# Patient Record
Sex: MALE | Race: OTHER | Employment: FULL TIME | URBAN - METROPOLITAN AREA
[De-identification: names, ages, dates, MRNs, and addresses within clinical notes are randomized per-mention and may not be internally consistent; named-entity substitution may affect disease eponyms.]

---

## 2024-10-11 ENCOUNTER — APPOINTMENT (OUTPATIENT)
Dept: CT IMAGING | Age: 39
DRG: 379 | End: 2024-10-11
Payer: COMMERCIAL

## 2024-10-11 ENCOUNTER — HOSPITAL ENCOUNTER (INPATIENT)
Age: 39
LOS: 2 days | Discharge: HOME | DRG: 379 | End: 2024-10-13
Attending: EMERGENCY MEDICINE | Admitting: INTERNAL MEDICINE
Payer: COMMERCIAL

## 2024-10-11 DIAGNOSIS — K92.1 MELENA: ICD-10-CM

## 2024-10-11 DIAGNOSIS — R10.13 EPIGASTRIC PAIN: Primary | ICD-10-CM

## 2024-10-11 DIAGNOSIS — D62 ACUTE BLOOD LOSS ANEMIA: ICD-10-CM

## 2024-10-11 PROBLEM — K92.2 CHRONIC GI BLEEDING: Status: ACTIVE | Noted: 2024-10-11

## 2024-10-11 LAB
ALBUMIN SERPL-MCNC: 3.5 G/DL (ref 3.5–5)
ALBUMIN/GLOB SERPL: 1.6 (ref 1–1.9)
ALP SERPL-CCNC: 59 U/L (ref 40–129)
ALT SERPL-CCNC: 17 U/L (ref 8–55)
ANION GAP SERPL CALC-SCNC: 9 MMOL/L (ref 9–18)
APPEARANCE UR: CLEAR
AST SERPL-CCNC: 33 U/L (ref 15–37)
BASOPHILS # BLD: 0 K/UL (ref 0–0.2)
BASOPHILS NFR BLD: 0 % (ref 0–2)
BILIRUB SERPL-MCNC: <0.2 MG/DL (ref 0–1.2)
BILIRUB UR QL: NEGATIVE
BUN SERPL-MCNC: 14 MG/DL (ref 6–23)
CALCIUM SERPL-MCNC: 8.5 MG/DL (ref 8.8–10.2)
CHLORIDE SERPL-SCNC: 107 MMOL/L (ref 98–107)
CO2 SERPL-SCNC: 25 MMOL/L (ref 20–28)
COLOR UR: NORMAL
CREAT SERPL-MCNC: 0.89 MG/DL (ref 0.8–1.3)
DIFFERENTIAL METHOD BLD: ABNORMAL
EOSINOPHIL # BLD: 0.1 K/UL (ref 0–0.8)
EOSINOPHIL NFR BLD: 1 % (ref 0.5–7.8)
ERYTHROCYTE [DISTWIDTH] IN BLOOD BY AUTOMATED COUNT: 14.6 % (ref 11.9–14.6)
GLOBULIN SER CALC-MCNC: 2.2 G/DL (ref 2.3–3.5)
GLUCOSE SERPL-MCNC: 98 MG/DL (ref 70–99)
GLUCOSE UR STRIP.AUTO-MCNC: NEGATIVE MG/DL
HCT VFR BLD AUTO: 21.7 % (ref 41.1–50.3)
HGB BLD-MCNC: 7.1 G/DL (ref 13.6–17.2)
HGB UR QL STRIP: NEGATIVE
HISTORY CHECK: NORMAL
IMM GRANULOCYTES # BLD AUTO: 0.1 K/UL (ref 0–0.5)
IMM GRANULOCYTES NFR BLD AUTO: 1 % (ref 0–5)
KETONES UR QL STRIP.AUTO: NEGATIVE MG/DL
LEUKOCYTE ESTERASE UR QL STRIP.AUTO: NEGATIVE
LIPASE SERPL-CCNC: 20 U/L (ref 13–60)
LYMPHOCYTES # BLD: 2.1 K/UL (ref 0.5–4.6)
LYMPHOCYTES NFR BLD: 21 % (ref 13–44)
MCH RBC QN AUTO: 29.3 PG (ref 26.1–32.9)
MCHC RBC AUTO-ENTMCNC: 32.7 G/DL (ref 31.4–35)
MCV RBC AUTO: 89.7 FL (ref 82–102)
MONOCYTES # BLD: 0.8 K/UL (ref 0.1–1.3)
MONOCYTES NFR BLD: 8 % (ref 4–12)
NEUTS SEG # BLD: 6.6 K/UL (ref 1.7–8.2)
NEUTS SEG NFR BLD: 68 % (ref 43–78)
NITRITE UR QL STRIP.AUTO: NEGATIVE
NRBC # BLD: 0 K/UL (ref 0–0.2)
PH UR STRIP: 5.5 (ref 5–9)
PLATELET # BLD AUTO: 226 K/UL (ref 150–450)
PMV BLD AUTO: 10 FL (ref 9.4–12.3)
POTASSIUM SERPL-SCNC: 3.8 MMOL/L (ref 3.5–5.1)
PROT SERPL-MCNC: 5.7 G/DL (ref 6.3–8.2)
PROT UR STRIP-MCNC: NEGATIVE MG/DL
RBC # BLD AUTO: 2.42 M/UL (ref 4.23–5.6)
SODIUM SERPL-SCNC: 141 MMOL/L (ref 136–145)
SP GR UR REFRACTOMETRY: 1.01 (ref 1–1.02)
UROBILINOGEN UR QL STRIP.AUTO: 0.2 EU/DL (ref 0.2–1)
WBC # BLD AUTO: 9.7 K/UL (ref 4.3–11.1)

## 2024-10-11 PROCEDURE — 2580000003 HC RX 258: Performed by: EMERGENCY MEDICINE

## 2024-10-11 PROCEDURE — 85025 COMPLETE CBC W/AUTO DIFF WBC: CPT

## 2024-10-11 PROCEDURE — 1100000000 HC RM PRIVATE

## 2024-10-11 PROCEDURE — 6360000004 HC RX CONTRAST MEDICATION: Performed by: INTERNAL MEDICINE

## 2024-10-11 PROCEDURE — 30233N1 TRANSFUSION OF NONAUTOLOGOUS RED BLOOD CELLS INTO PERIPHERAL VEIN, PERCUTANEOUS APPROACH: ICD-10-PCS | Performed by: INTERNAL MEDICINE

## 2024-10-11 PROCEDURE — 83690 ASSAY OF LIPASE: CPT

## 2024-10-11 PROCEDURE — 86901 BLOOD TYPING SEROLOGIC RH(D): CPT

## 2024-10-11 PROCEDURE — P9016 RBC LEUKOCYTES REDUCED: HCPCS

## 2024-10-11 PROCEDURE — 80053 COMPREHEN METABOLIC PANEL: CPT

## 2024-10-11 PROCEDURE — 74177 CT ABD & PELVIS W/CONTRAST: CPT

## 2024-10-11 PROCEDURE — 81003 URINALYSIS AUTO W/O SCOPE: CPT

## 2024-10-11 PROCEDURE — 84466 ASSAY OF TRANSFERRIN: CPT

## 2024-10-11 PROCEDURE — 86900 BLOOD TYPING SEROLOGIC ABO: CPT

## 2024-10-11 PROCEDURE — 86850 RBC ANTIBODY SCREEN: CPT

## 2024-10-11 PROCEDURE — 86923 COMPATIBILITY TEST ELECTRIC: CPT

## 2024-10-11 PROCEDURE — 83540 ASSAY OF IRON: CPT

## 2024-10-11 PROCEDURE — 36430 TRANSFUSION BLD/BLD COMPNT: CPT

## 2024-10-11 PROCEDURE — 99285 EMERGENCY DEPT VISIT HI MDM: CPT

## 2024-10-11 PROCEDURE — 6360000002 HC RX W HCPCS: Performed by: EMERGENCY MEDICINE

## 2024-10-11 RX ORDER — IOPAMIDOL 755 MG/ML
100 INJECTION, SOLUTION INTRAVASCULAR
Status: COMPLETED | OUTPATIENT
Start: 2024-10-11 | End: 2024-10-11

## 2024-10-11 RX ADMIN — IOPAMIDOL 100 ML: 755 INJECTION, SOLUTION INTRAVENOUS at 23:45

## 2024-10-11 RX ADMIN — PANTOPRAZOLE SODIUM 80 MG: 40 INJECTION, POWDER, FOR SOLUTION INTRAVENOUS at 23:17

## 2024-10-11 ASSESSMENT — LIFESTYLE VARIABLES
HOW MANY STANDARD DRINKS CONTAINING ALCOHOL DO YOU HAVE ON A TYPICAL DAY: 1 OR 2
HOW OFTEN DO YOU HAVE A DRINK CONTAINING ALCOHOL: MONTHLY OR LESS

## 2024-10-12 PROBLEM — R10.13 EPIGASTRIC PAIN: Status: ACTIVE | Noted: 2024-10-12

## 2024-10-12 LAB
ABO + RH BLD: NORMAL
ANION GAP SERPL CALC-SCNC: 9 MMOL/L (ref 9–18)
BLD PROD TYP BPU: NORMAL
BLOOD BANK BLOOD PRODUCT EXPIRATION DATE: NORMAL
BLOOD BANK CMNT PATIENT-IMP: NORMAL
BLOOD BANK DISPENSE STATUS: NORMAL
BLOOD BANK ISBT PRODUCT BLOOD TYPE: 9500
BLOOD BANK UNIT TYPE AND RH: NORMAL
BLOOD GROUP ANTIBODIES SERPL: NORMAL
BPU ID: NORMAL
BUN SERPL-MCNC: 9 MG/DL (ref 6–23)
CALCIUM SERPL-MCNC: 8.3 MG/DL (ref 8.8–10.2)
CHLORIDE SERPL-SCNC: 107 MMOL/L (ref 98–107)
CO2 SERPL-SCNC: 25 MMOL/L (ref 20–28)
CREAT SERPL-MCNC: 0.76 MG/DL (ref 0.8–1.3)
CROSSMATCH RESULT: NORMAL
ERYTHROCYTE [DISTWIDTH] IN BLOOD BY AUTOMATED COUNT: 15 % (ref 11.9–14.6)
FERRITIN SERPL-MCNC: 59 NG/ML (ref 8–388)
GLUCOSE SERPL-MCNC: 98 MG/DL (ref 70–99)
HCT VFR BLD AUTO: 22.3 % (ref 41.1–50.3)
HCT VFR BLD AUTO: 24.5 % (ref 41.1–50.3)
HCT VFR BLD AUTO: 25.4 % (ref 41.1–50.3)
HCT VFR BLD AUTO: 25.5 % (ref 41.1–50.3)
HGB BLD-MCNC: 7.2 G/DL (ref 13.6–17.2)
HGB BLD-MCNC: 8.1 G/DL (ref 13.6–17.2)
HGB BLD-MCNC: 8.2 G/DL (ref 13.6–17.2)
HGB BLD-MCNC: 8.3 G/DL (ref 13.6–17.2)
IRON SATN MFR SERPL: 20 % (ref 20–50)
IRON SERPL-MCNC: 45 UG/DL (ref 35–100)
MCH RBC QN AUTO: 29.3 PG (ref 26.1–32.9)
MCHC RBC AUTO-ENTMCNC: 33.1 G/DL (ref 31.4–35)
MCV RBC AUTO: 88.8 FL (ref 82–102)
NRBC # BLD: 0 K/UL (ref 0–0.2)
PLATELET # BLD AUTO: 205 K/UL (ref 150–450)
PMV BLD AUTO: 10.1 FL (ref 9.4–12.3)
POTASSIUM SERPL-SCNC: 3.8 MMOL/L (ref 3.5–5.1)
RBC # BLD AUTO: 2.76 M/UL (ref 4.23–5.6)
SODIUM SERPL-SCNC: 141 MMOL/L (ref 136–145)
SPECIMEN EXP DATE BLD: NORMAL
TIBC SERPL-MCNC: 224 UG/DL (ref 240–450)
TRANSFERRIN SERPL-MCNC: 187 MG/DL (ref 200–360)
UIBC SERPL-MCNC: 179 UG/DL (ref 112–347)
UNIT DIVISION: 0
UNIT ISSUE DATE/TIME: NORMAL
WBC # BLD AUTO: 6.1 K/UL (ref 4.3–11.1)

## 2024-10-12 PROCEDURE — 82728 ASSAY OF FERRITIN: CPT

## 2024-10-12 PROCEDURE — 2580000003 HC RX 258: Performed by: INTERNAL MEDICINE

## 2024-10-12 PROCEDURE — 80048 BASIC METABOLIC PNL TOTAL CA: CPT

## 2024-10-12 PROCEDURE — 43235 EGD DIAGNOSTIC BRUSH WASH: CPT | Performed by: INTERNAL MEDICINE

## 2024-10-12 PROCEDURE — 85027 COMPLETE CBC AUTOMATED: CPT

## 2024-10-12 PROCEDURE — 85018 HEMOGLOBIN: CPT

## 2024-10-12 PROCEDURE — 85014 HEMATOCRIT: CPT

## 2024-10-12 PROCEDURE — 6360000002 HC RX W HCPCS: Performed by: INTERNAL MEDICINE

## 2024-10-12 PROCEDURE — 1100000003 HC PRIVATE W/ TELEMETRY

## 2024-10-12 RX ORDER — SODIUM CHLORIDE 0.9 % (FLUSH) 0.9 %
5-40 SYRINGE (ML) INJECTION EVERY 12 HOURS SCHEDULED
Status: DISCONTINUED | OUTPATIENT
Start: 2024-10-12 | End: 2024-10-13 | Stop reason: HOSPADM

## 2024-10-12 RX ORDER — SODIUM CHLORIDE 9 MG/ML
INJECTION, SOLUTION INTRAVENOUS PRN
Status: DISCONTINUED | OUTPATIENT
Start: 2024-10-12 | End: 2024-10-13 | Stop reason: HOSPADM

## 2024-10-12 RX ORDER — ACETAMINOPHEN 325 MG/1
650 TABLET ORAL EVERY 6 HOURS PRN
Status: DISCONTINUED | OUTPATIENT
Start: 2024-10-12 | End: 2024-10-13 | Stop reason: HOSPADM

## 2024-10-12 RX ORDER — ONDANSETRON 4 MG/1
4 TABLET, ORALLY DISINTEGRATING ORAL EVERY 8 HOURS PRN
Status: DISCONTINUED | OUTPATIENT
Start: 2024-10-12 | End: 2024-10-13 | Stop reason: HOSPADM

## 2024-10-12 RX ORDER — ONDANSETRON 2 MG/ML
4 INJECTION INTRAMUSCULAR; INTRAVENOUS EVERY 6 HOURS PRN
Status: DISCONTINUED | OUTPATIENT
Start: 2024-10-12 | End: 2024-10-13 | Stop reason: HOSPADM

## 2024-10-12 RX ORDER — ACETAMINOPHEN 650 MG/1
650 SUPPOSITORY RECTAL EVERY 6 HOURS PRN
Status: DISCONTINUED | OUTPATIENT
Start: 2024-10-12 | End: 2024-10-13 | Stop reason: HOSPADM

## 2024-10-12 RX ORDER — SODIUM CHLORIDE, SODIUM LACTATE, POTASSIUM CHLORIDE, CALCIUM CHLORIDE 600; 310; 30; 20 MG/100ML; MG/100ML; MG/100ML; MG/100ML
INJECTION, SOLUTION INTRAVENOUS CONTINUOUS
Status: DISCONTINUED | OUTPATIENT
Start: 2024-10-12 | End: 2024-10-12

## 2024-10-12 RX ORDER — SODIUM CHLORIDE 0.9 % (FLUSH) 0.9 %
5-40 SYRINGE (ML) INJECTION PRN
Status: DISCONTINUED | OUTPATIENT
Start: 2024-10-12 | End: 2024-10-13 | Stop reason: HOSPADM

## 2024-10-12 RX ADMIN — PANTOPRAZOLE SODIUM 40 MG: 40 INJECTION, POWDER, FOR SOLUTION INTRAVENOUS at 19:29

## 2024-10-12 RX ADMIN — SODIUM CHLORIDE, POTASSIUM CHLORIDE, SODIUM LACTATE AND CALCIUM CHLORIDE: 600; 310; 30; 20 INJECTION, SOLUTION INTRAVENOUS at 00:25

## 2024-10-12 RX ADMIN — PANTOPRAZOLE SODIUM 40 MG: 40 INJECTION, POWDER, FOR SOLUTION INTRAVENOUS at 09:24

## 2024-10-12 RX ADMIN — SODIUM CHLORIDE, PRESERVATIVE FREE 10 ML: 5 INJECTION INTRAVENOUS at 19:29

## 2024-10-12 ASSESSMENT — PAIN SCALES - GENERAL
PAINLEVEL_OUTOF10: 0
PAINLEVEL_OUTOF10: 0

## 2024-10-12 NOTE — PROGRESS NOTES
4 Eyes Skin Assessment     NAME:  Edwin Reilly  YOB: 1985  MEDICAL RECORD NUMBER:  [id number]    The patient is being assessed for  Admission    I agree that at least one RN has performed a thorough Head to Toe Skin Assessment on the patient. ALL assessment sites listed below have been assessed.      Areas assessed by both nurses:    Head, Face, Ears, Shoulders, Back, Chest, Arms, Elbows, Hands, Sacrum. Buttock, Coccyx, Ischium, and Legs. Feet and Heels        Does the Patient have a Wound? No noted wound(s)       Inocencio Prevention initiated by RN: No  Wound Care Orders initiated by RN: No    Pressure Injury (Stage 3,4, Unstageable, DTI, NWPT, and Complex wounds) if present, place Wound referral order by RN under : No    New Ostomies, if present place, Ostomy referral order under : No     Nurse 1 eSignature: Electronically signed by LOUIE MILNER RN on 10/12/24 at 12:31 AM EDT    **SHARE this note so that the co-signing nurse can place an eSignature**    Nurse 2 eSignature: Electronically signed by Allie Shahid RN on 10/12/24 at 1:09 AM EDT    Libtayo Counseling- I discussed with the patient the risks of Libtayo including but not limited to nausea, vomiting, diarrhea, and bone or muscle pain.  The patient verbalized understanding of the proper use and possible adverse effects of Libtayo.  All of the patient's questions and concerns were addressed.

## 2024-10-12 NOTE — H&P
Hospitalist History and Physical   Admit Date:  10/11/2024  8:55 PM   Name:  Edwin Reilly   Age:  39 y.o.  Sex:  male  :  1985   MRN:  [id number]   Room:  [place] [address]    Presenting/Chief Complaint: Melena and Abdominal Pain     Reason(s) for Admission: Chronic GI bleeding [K92.2]     History of Present Illness:   Edwin Reilly is a 39 y.o. male with no signal past medical history presents to the emergency room complaining of abdominal pain mainly in the epigastric area associate with some dark stools over the last 3 days.  He states that he has been having 1 or 2 episodes per day denies ever having history of any rectal bleeding.  Denies any nausea vomiting or hematemesis he is not on any blood thinner does not take any medications he did take ibuprofen 400 mg daily for the last 3 days because of abdominal pain patient was evaluated with ED physician subsequently found to have hemoglobin of 7.1 Hemoccult was positive subsequently GI was called was called and they recommended admitting the patient here at [facility] [place] they will see the patient in the morning and hence the patient will be admitted for GI bleeding will start him on Protonix keep him n.p.o. start him on IV fluids hopefully GI will be consulted for the morning so that they can see him and probably scoped him find out if it is a peptic ulcer disease although he does not give any history of history of dyspepsia etc. however despite being Hemoccult positive hemoglobin being so low he does not have elevation of the BUN or creatinine suggestive of an acute bleed this could be chronic and hence will also check LDH and haptoglobin to make sure there is no hemolysis #1 the other thing is possibly CT of the abdomen pelvis to see for any retroperitoneal hematoma etc.      Assessment & Plan:     Principal Problem:  Chronic GI bleeding  On fluids  On IV Protonix twice daily  NPO  GI to see in a.m.  Check LDH and serum haptoglobin  CT

## 2024-10-12 NOTE — ED TRIAGE NOTES
Pt presents with complaint of abdominal and back pain.  Pt reports he has been having dark stools for three days.  He denies n/v/d. Denies thinners.     required.

## 2024-10-12 NOTE — CONSENT
Informed Consent for Blood Component Transfusion Note    I have discussed with the patient the rationale for blood component transfusion; its benefits in treating or preventing fatigue, organ damage, or death; and its risk which includes mild transfusion reactions, rare risk of blood borne infection, or more serious but rare reactions. I have discussed the alternatives to transfusion, including the risk and consequences of not receiving transfusion. The patient had an opportunity to ask questions and had agreed to proceed with transfusion of blood components.    Electronically signed by Arielle Bess MD on 10/11/24 at 10:13 PM EDT

## 2024-10-12 NOTE — PROGRESS NOTES
Creatinine 0.89 0.80 - 1.30 MG/DL    Est, Glom Filt Rate >90 >60 ml/min/1.73m2    Calcium 8.5 (L) 8.8 - 10.2 MG/DL    Total Bilirubin <0.2 0.0 - 1.2 MG/DL    ALT 17 8 - 55 U/L    AST 33 15 - 37 U/L    Alk Phosphatase 59 40 - 129 U/L    Total Protein 5.7 (L) 6.3 - 8.2 g/dL    Albumin 3.5 3.5 - 5.0 g/dL    Globulin 2.2 (L) 2.3 - 3.5 g/dL    Albumin/Globulin Ratio 1.6 1.0 - 1.9     Lipase    Collection Time: 10/11/24  9:04 PM   Result Value Ref Range    Lipase 20 13 - 60 U/L   Urinalysis “IF” dysuria, frequency, or urgency.    Collection Time: 10/11/24  9:04 PM   Result Value Ref Range    Color, UA YELLOW/STRAW      Appearance CLEAR      Specific Gravity, UA 1.014 1.001 - 1.023      pH, Urine 5.5 5.0 - 9.0      Protein, UA Negative NEG mg/dL    Glucose, Ur Negative NEG mg/dL    Ketones, Urine Negative NEG mg/dL    Bilirubin, Urine Negative NEG      Blood, Urine Negative NEG      Urobilinogen, Urine 0.2 0.2 - 1.0 EU/dL    Nitrite, Urine Negative NEG      Leukocyte Esterase, Urine Negative NEG     Iron and TIBC    Collection Time: 10/11/24  9:04 PM   Result Value Ref Range    Iron 45 35 - 100 ug/dL    TIBC 224 (L) 240 - 450 ug/dL    Iron % Saturation 20 20 - 50 %    UIBC 179.0 112.0 - 347.0 ug/dL   Transferrin    Collection Time: 10/11/24  9:04 PM   Result Value Ref Range    Transferrin 187 (L) 200 - 360 mg/dL   TYPE AND SCREEN    Collection Time: 10/11/24  9:41 PM   Result Value Ref Range    Crossmatch expiration date 10/14/2024,6209     ABO/Rh O NEGATIVE     Antibody Screen NEG     Blood Bank Comment CALLED ER AT 2236 THAT BLOOD IS READY. DUNN/6295     Unit Number [id number]     Product Code Blood Bank RC LR     Unit Divison 00     Dispense Status Blood Bank TRANSFUSED     Unit Issue Date/Time [id number]     Blood Bank Unit Type and Rh O NEG     Blood Bank ISBT Product Blood Type 9500     Blood Bank Blood Product Expiration Date [id number]     Crossmatch Result Compatible    PREPARE RBC (CROSSMATCH), 1 Units     dictation software.  The note has been proof read but may still contain some grammatical/other typographical errors.

## 2024-10-12 NOTE — CARE COORDINATION
CM note:    Chart reviewed for updates. Pt is Sami speaking. [employer] interpreters used.CM met with pt at bedside, introduced role, confirmed demographics and discussed d/c planning. Patient is a 39 y.o. year old male admitted for Stomach and back pain. Pt lives with spouse in an apartment. He is independent with ADL's at home and he does not use any DME at home. Pt is insured but he does not have a PCP. Referral sent to [facility] for a PCP as requested by the patient. Pt is an active  in the community. He denied history of IPR/SNF/HH. Pt's spouse and cousin are his supports. Plan is for pt to discharge home with family support pending medical clearance.      10/12/24 0095   Service Assessment   Patient Orientation Alert and Oriented   Cognition Alert   History Provided By Patient   Primary Caregiver Self   Accompanied By/Relationship N/A   Support Systems Spouse/Significant Other   Patient's Healthcare Decision Maker is: Legal Next of Kin  (Spouse)   Prior Functional Level Independent in ADLs/IADLs   Current Functional Level Independent in ADLs/IADLs   Can patient return to prior living arrangement Yes   Ability to make needs known: Good   Family able to assist with home care needs: Yes   Would you like for me to discuss the discharge plan with any other family members/significant others, and if so, who? No   Financial Resources Other (Comment)  (BCBS)   Social/Functional History   Lives With Spouse   Type of Home Apartment   Home Equipment None   ADL Assistance Independent   Ambulation Assistance Independent   Active  Yes   Discharge Planning   Type of Residence Apartment   Living Arrangements Spouse/Significant Other   Current Services Prior To Admission None   Potential Assistance Needed N/A   DME Ordered? No   Potential Assistance Purchasing Medications No   Type of Home Care Services None   Patient expects to be discharged to: [facility]

## 2024-10-12 NOTE — PROGRESS NOTES
Received pt from ED. Receiving 1UPRBC. A&Ox4 and ambulatory. Oriented to room and call bell. Family at bedside.

## 2024-10-12 NOTE — ED PROVIDER NOTES
Emergency Department Provider Note       PCP: No, Pcp   Age: 39 y.o.   Sex: male     DISPOSITION Decision To Admit 10/11/2024 10:36:37 PM  Condition at Disposition: Data Unavailable       ICD-10-CM    1. Epigastric pain  R10.13       2. Melena  K92.1       3. Acute blood loss anemia  D62           Medical Decision Making     Patient has melena for the last 3 days he is a healthy 39-year-old. He has no prior CBCs but his HGB is 7.1. Hemoccult positive. I suspect he is likely had an ulcer as he has some epigastric discomfort and some reflux. he is Hemodynamically stable.  Given that he has active bleed and likely acute drop in blood over the last 3 days. I've ordered 1 unit of blood and 80 mg of protonix bolus. I spoke with GI and they said he can stay at [facility] and they will consult here.  I communicated with Hospitalist for admission.       1 or more acute illnesses that pose a threat to life or bodily function.   Drug therapy given requiring intensive monitoring for toxicity.  Discussion with external consultants.  Shared medical decision making was utilized in creating the patients health plan today.  I independently ordered and reviewed each unique test.               The patient was admitted and I have discussed patient management with the admitting provider.  The management of this patient was discussed with an external consultant.        Critical care procedure note : 35 minutes of critical care time was performed in the emergency department. This was separate from any other procedures listed during the patients emergency department course. The failure to initiate these interventions on an urgent basis would likely have resulted in sudden, clinically significant or life-threatening deterioration in the patients condition.     History     Patient has abdominal pain in epigastric region and some dark stools over the last 3 days.  Has been having a couple episodes per day.  He denies ever having a history

## 2024-10-12 NOTE — PROGRESS NOTES
Hospitalist Progress Note   Admit Date:  10/11/2024  8:55 PM   Name:  Edwin Reilly   Age:  39 y.o.  Sex:  male  :  1985   MRN:  [id number]   Room:  [facility]/    Presenting/Chief Complaint: Melena and Abdominal Pain     Reason(s) for Admission: Melena [K92.1]  Acute blood loss anemia [D62]  Epigastric pain [R10.13]  Chronic GI bleeding [K92.2]     Hospital Course:   Edwin Reilly is a 39 y.o. male with no signal past medical history presents to the emergency room complaining of abdominal pain mainly in the epigastric area associated with some dark stools over the last 3 days.  Patient reports that the stools black.  He denied any significant fevers, chills, chest pain, nausea, vomiting.  He currently does not take any blood thinners, antiplatelet agents.  He has been taking ibuprofen and daily for the last 3 days due to abdominal pain.    In the ER, patient was found to have hemoglobin of 7.1.  Hemoccult was also positive.  GI was called and patient was admitted to hospitalist service for further workup.  Patient was given 1 unit PRBCs with appropriate hemoglobin response.    Subjective & 24hr Events:   Patient was seen and evaluated at bedside this morning.  Reports having minimal abdominal discomfort today.  Did have 1 bowel movement overnight that was not dark.  Denies any fevers chills, shortness of breath, dizziness, nausea, vomiting.      Assessment & Plan:     Normocytic anemia secondary to suspected GI bleeding  Hemoglobin was 7.1 on admission, hemoglobin responded appropriately after 1 unit PRBCs given.  Suspect upper GI bleed.  Appears to be well compensated as patient is not tachycardic or hypotensive on admission with a hemoglobin of 7.1.  Iron studies not impressive for significant iron deficiency anemia.  CT abdomen pelvis showing no evidence of colorectal mass.    -Follow-up GI consult  -Clear liquid diet today  -Continue with Protonix  -Transfuse to maintain hemoglobin greater     CO2 25 20 - 28 mmol/L    Anion Gap 9 9 - 18 mmol/L    Glucose 98 70 - 99 mg/dL    BUN 9 6 - 23 MG/DL    Creatinine 0.76 (L) 0.80 - 1.30 MG/DL    Est, Glom Filt Rate >90 >60 ml/min/1.73m2    Calcium 8.3 (L) 8.8 - 10.2 MG/DL   CBC    Collection Time: 10/12/24  8:19 AM   Result Value Ref Range    WBC 6.1 4.3 - 11.1 K/uL    RBC 2.76 (L) 4.23 - 5.6 M/uL    Hemoglobin 8.1 (L) 13.6 - 17.2 g/dL    Hematocrit 24.5 (L) 41.1 - 50.3 %    MCV 88.8 82.0 - 102.0 FL    MCH 29.3 26.1 - 32.9 PG    MCHC 33.1 31.4 - 35.0 g/dL    RDW 15.0 (H) 11.9 - 14.6 %    Platelets 205 150 - 450 K/uL    MPV 10.1 9.4 - 12.3 FL    nRBC 0.00 0.0 - 0.2 K/uL   Ferritin    Collection Time: 10/12/24  8:19 AM   Result Value Ref Range    Ferritin 59 8 - 388 NG/ML       No results for input(s): \"COVID19\" in the last 72 hours.    Current Meds:  Current Facility-Administered Medications   Medication Dose Route Frequency    lactated ringers IV soln infusion   IntraVENous Continuous    sodium chloride flush 0.9 % injection 5-40 mL  5-40 mL IntraVENous 2 times per day    sodium chloride flush 0.9 % injection 5-40 mL  5-40 mL IntraVENous PRN    0.9 % sodium chloride infusion   IntraVENous PRN    ondansetron (ZOFRAN-ODT) disintegrating tablet 4 mg  4 mg Oral Q8H PRN    Or    ondansetron (ZOFRAN) injection 4 mg  4 mg IntraVENous Q6H PRN    acetaminophen (TYLENOL) tablet 650 mg  650 mg Oral Q6H PRN    Or    acetaminophen (TYLENOL) suppository 650 mg  650 mg Rectal Q6H PRN    pantoprazole (PROTONIX) 40 mg in sodium chloride (PF) 0.9 % 10 mL injection  40 mg IntraVENous Q12H       Signed:  Jarred Bruno MD    Part of this note may have been written by using a voice dictation software.  The note has been proof read but may still contain some grammatical/other typographical errors.

## 2024-10-12 NOTE — PROGRESS NOTES
TRANSFER - IN REPORT:    Verbal report received from HOLA Sher on Edwin Reilly  being received from ED for routine progression of patient care      Report consisted of patient's Situation, Background, Assessment and   Recommendations(SBAR).     Information from the following report(s) Nurse Handoff Report, Index, ED Encounter Summary, ED SBAR, Neuro Assessment, and Event Log was reviewed with the receiving nurse.    Opportunity for questions and clarification was provided.      Assessment completed upon patient's arrival to unit and care assumed.

## 2024-10-12 NOTE — CONSULTS
GASTROENTEROLOGY CONSULT NOTE     CC: Anemia     HPI:   Edwin Reilly is 39 y.o. y/o male presenting with acute anemias with possible melena. Per patient last week Wednesday he noticed black stools and then again yesterday. He endorses epigastric abdominal pain that radiates to his back. He takes Ibuprofen a few times week but started taking it more over the last few times because of abdominal pain. He was found to have hgb 7.1 in the ER. Last BM was overnight, which he states was normal.     PMH/PSH:   No significant PMH     FMH:   Denies FMH gastric or colorectal cancer   Denies FMH autoimmune disease     PE:   Vitals:    10/12/24 0800   BP: 134/77   Pulse: 70   Resp: 18   Temp: 97.8 °F (36.6 °C)   SpO2: 96%      General:  The patient appears well-nourished, and is in no acute distress.    Respiratory: Respiratory effort is normal. Expansion maintained bilaterally and symmetrically.    Cardiovascular:  Regular rate and rhythm.     Abdomen:  Soft, non tender to palpation. No distention.    Extremities: No edema bilaterally. No erythema  Neurologic:  Alert and oriented x3      Labs:  Lab Results   Component Value Date    HGB 8.1 (L) 10/12/2024    WBC 6.1 10/12/2024     10/12/2024    MCV 88.8 10/12/2024    IRON 45 10/11/2024    FERRITIN 59 10/12/2024    TIBC 224 (L) 10/11/2024    CREATININE 0.76 (L) 10/12/2024    ALT 17 10/11/2024    AST 33 10/11/2024         Imaging:   Ct abdomen pelvis 10/11/24  No acute findings in the abdomen or pelvis.     Endoscopy:   None       Assessment/Plan:   Anemia with possible melena. Plan for EGD tomorrow. NPO midnight. Ok for clear liquid diet today. IV PPI BID.     Please call GI team with any questions or concerns.       Enedina Kirby MD  [facility] Gastroenterology

## 2024-10-12 NOTE — ED NOTES
Report given to RN for 332/ pts VS updated and rate change completed/ pt to CT and transported upstairs by lorena SIMENTAL Lakhwinder

## 2024-10-13 ENCOUNTER — ANESTHESIA (OUTPATIENT)
Dept: ENDOSCOPY | Age: 39
End: 2024-10-13
Payer: COMMERCIAL

## 2024-10-13 ENCOUNTER — ANESTHESIA EVENT (OUTPATIENT)
Dept: ENDOSCOPY | Age: 39
End: 2024-10-13
Payer: COMMERCIAL

## 2024-10-13 VITALS
HEIGHT: 68 IN | DIASTOLIC BLOOD PRESSURE: 52 MMHG | SYSTOLIC BLOOD PRESSURE: 105 MMHG | RESPIRATION RATE: 15 BRPM | TEMPERATURE: 97.5 F | OXYGEN SATURATION: 100 % | WEIGHT: 185 LBS | BODY MASS INDEX: 28.04 KG/M2 | HEART RATE: 79 BPM

## 2024-10-13 LAB
ANION GAP SERPL CALC-SCNC: 10 MMOL/L (ref 9–18)
BUN SERPL-MCNC: 8 MG/DL (ref 6–23)
CALCIUM SERPL-MCNC: 8.2 MG/DL (ref 8.8–10.2)
CHLORIDE SERPL-SCNC: 106 MMOL/L (ref 98–107)
CO2 SERPL-SCNC: 25 MMOL/L (ref 20–28)
CREAT SERPL-MCNC: 0.8 MG/DL (ref 0.8–1.3)
ERYTHROCYTE [DISTWIDTH] IN BLOOD BY AUTOMATED COUNT: 15 % (ref 11.9–14.6)
GLUCOSE SERPL-MCNC: 90 MG/DL (ref 70–99)
HCT VFR BLD AUTO: 25.6 % (ref 41.1–50.3)
HGB BLD-MCNC: 8.4 G/DL (ref 13.6–17.2)
MCH RBC QN AUTO: 29.2 PG (ref 26.1–32.9)
MCHC RBC AUTO-ENTMCNC: 32.8 G/DL (ref 31.4–35)
MCV RBC AUTO: 88.9 FL (ref 82–102)
NRBC # BLD: 0 K/UL (ref 0–0.2)
PLATELET # BLD AUTO: 236 K/UL (ref 150–450)
PMV BLD AUTO: 9.7 FL (ref 9.4–12.3)
POTASSIUM SERPL-SCNC: 3.9 MMOL/L (ref 3.5–5.1)
RBC # BLD AUTO: 2.88 M/UL (ref 4.23–5.6)
SODIUM SERPL-SCNC: 141 MMOL/L (ref 136–145)
WBC # BLD AUTO: 6.4 K/UL (ref 4.3–11.1)

## 2024-10-13 PROCEDURE — 3700000000 HC ANESTHESIA ATTENDED CARE: Performed by: INTERNAL MEDICINE

## 2024-10-13 PROCEDURE — 88312 SPECIAL STAINS GROUP 1: CPT

## 2024-10-13 PROCEDURE — 88305 TISSUE EXAM BY PATHOLOGIST: CPT

## 2024-10-13 PROCEDURE — 36415 COLL VENOUS BLD VENIPUNCTURE: CPT

## 2024-10-13 PROCEDURE — 2580000003 HC RX 258: Performed by: INTERNAL MEDICINE

## 2024-10-13 PROCEDURE — 85027 COMPLETE CBC AUTOMATED: CPT

## 2024-10-13 PROCEDURE — 3700000001 HC ADD 15 MINUTES (ANESTHESIA): Performed by: INTERNAL MEDICINE

## 2024-10-13 PROCEDURE — 6360000002 HC RX W HCPCS: Performed by: INTERNAL MEDICINE

## 2024-10-13 PROCEDURE — 0DB68ZX EXCISION OF STOMACH, VIA NATURAL OR ARTIFICIAL OPENING ENDOSCOPIC, DIAGNOSTIC: ICD-10-PCS | Performed by: INTERNAL MEDICINE

## 2024-10-13 PROCEDURE — 6360000002 HC RX W HCPCS: Performed by: ANESTHESIOLOGY

## 2024-10-13 PROCEDURE — 7100000010 HC PHASE II RECOVERY - FIRST 15 MIN: Performed by: INTERNAL MEDICINE

## 2024-10-13 PROCEDURE — 80048 BASIC METABOLIC PNL TOTAL CA: CPT

## 2024-10-13 PROCEDURE — 2500000003 HC RX 250 WO HCPCS: Performed by: ANESTHESIOLOGY

## 2024-10-13 PROCEDURE — 3609012400 HC EGD TRANSORAL BIOPSY SINGLE/MULTIPLE: Performed by: INTERNAL MEDICINE

## 2024-10-13 PROCEDURE — 2709999900 HC NON-CHARGEABLE SUPPLY: Performed by: INTERNAL MEDICINE

## 2024-10-13 PROCEDURE — 7100000011 HC PHASE II RECOVERY - ADDTL 15 MIN: Performed by: INTERNAL MEDICINE

## 2024-10-13 RX ORDER — SODIUM CHLORIDE 9 MG/ML
INJECTION, SOLUTION INTRAVENOUS CONTINUOUS
Status: DISCONTINUED | OUTPATIENT
Start: 2024-10-13 | End: 2024-10-13 | Stop reason: HOSPADM

## 2024-10-13 RX ORDER — EPHEDRINE SULFATE/0.9% NACL/PF 50 MG/5 ML
SYRINGE (ML) INTRAVENOUS
Status: DISCONTINUED | OUTPATIENT
Start: 2024-10-13 | End: 2024-10-13 | Stop reason: SDUPTHER

## 2024-10-13 RX ORDER — PROPOFOL 10 MG/ML
INJECTION, EMULSION INTRAVENOUS
Status: DISCONTINUED | OUTPATIENT
Start: 2024-10-13 | End: 2024-10-13 | Stop reason: SDUPTHER

## 2024-10-13 RX ORDER — PANTOPRAZOLE SODIUM 40 MG/1
40 TABLET, DELAYED RELEASE ORAL
Qty: 112 TABLET | Refills: 0 | Status: SHIPPED | OUTPATIENT
Start: 2024-10-13 | End: 2024-12-08

## 2024-10-13 RX ADMIN — PHENYLEPHRINE HYDROCHLORIDE 150 MCG: 0.1 INJECTION, SOLUTION INTRAVENOUS at 08:20

## 2024-10-13 RX ADMIN — Medication 10 MG: at 08:20

## 2024-10-13 RX ADMIN — Medication 10 MG: at 08:14

## 2024-10-13 RX ADMIN — PROPOFOL 100 MG: 10 INJECTION, EMULSION INTRAVENOUS at 08:10

## 2024-10-13 RX ADMIN — PROPOFOL 50 MG: 10 INJECTION, EMULSION INTRAVENOUS at 08:14

## 2024-10-13 RX ADMIN — PROPOFOL 25 MG: 10 INJECTION, EMULSION INTRAVENOUS at 08:18

## 2024-10-13 RX ADMIN — PANTOPRAZOLE SODIUM 40 MG: 40 INJECTION, POWDER, FOR SOLUTION INTRAVENOUS at 09:23

## 2024-10-13 NOTE — CARE COORDINATION
CM note:    Chart reviewed for updates. Pt has a discharge order. CM notified that pt was requesting to speak with CM regarding financial assistance. Pt is Bengali speaking. CM used [employer] interpreters. Session code 62015. : Nat. CM introduced role and discussed financial concerns with pt. Pt notified that he has BCBS and it should be able to cover for his inpatient stay. Pt was given financial assistance form, [employer], [employer] and community resources. He was notified that he may or may not qualify for financial assistance since he does have BCBS insurance. No additional questions/concerns. Spouse at bedside will provide transport. No further CM needs identified. CM will remain accessible for consult incase additional CM needs arise prior d/c.     10/13/24 1215   Services At/After Discharge   Transition of Care Consult (CM Consult) Discharge Planning   Services At/After Discharge Community Resources   [place] Resource Information Provided? No   Mode of Transport at Discharge Other (see comment)  (Spouse)   Confirm Follow Up Transport Family   Condition of Participation: Discharge Planning   The Plan for Transition of Care is related to the following treatment goals: Pt is discharging home with family support   The Patient and/Or Patient Representative agree with the Discharge Plan? Yes     TYLER Cardenas

## 2024-10-13 NOTE — DISCHARGE SUMMARY
Hospitalist Discharge Summary   Admit Date:  10/11/2024  8:55 PM   DC Note date: 10/13/2024  Name:  Edwin Reilly   Age:  39 y.o.  Sex:  male  :  1985   MRN:  [id number]   Room:  [facility]  PCP:  No, Pcp    Presenting Complaint: Melena and Abdominal Pain     Initial Admission Diagnosis: Melena [K92.1]  Acute blood loss anemia [D62]  Epigastric pain [R10.13]  Chronic GI bleeding [K92.2]     Problem List for this Hospitalization (present on admission):    Principal Problem:    Chronic GI bleeding  Active Problems:    Epigastric pain  Resolved Problems:    * No resolved hospital problems. *      Hospital Course:  Edwin Reilly is a 39 y.o. male with no signal past medical history presents to the emergency room complaining of abdominal pain mainly in the epigastric area associated with some dark stools over the last 3 days.  Patient reports that the stools black.  He denied any significant fevers, chills, chest pain, nausea, vomiting.  He currently does not take any blood thinners, antiplatelet agents.  He has been taking ibuprofen and daily for the last 3 days due to abdominal pain.     In the ER, patient was found to have hemoglobin of 7.1.  Hemoccult was also positive.  GI was called and patient was admitted to hospitalist service for further workup.  Patient was given 1 unit PRBCs with appropriate hemoglobin response.    Patient underwent EGD on 10/13 with GI team.  Patient was found to have a duodenal ulcer as well as gastric ulcer with no evidence of recent bleeding.  Biopsies were taken for H. pylori.  Patient is medically stable for discharge today.  Patient will be discharged on PPI to be taken twice daily for 8 weeks.  Patient will need to follow-up with GI team within the next 2 to 3 weeks for follow-up regarding his H. pylori biopsy.  Patient will also need to establish care with a primary care physician as well which she states that he will do.  All questions were answered at bedside.   Negative 10/11/2024 09:04 PM    BILIRUBINUR Negative 10/11/2024 09:04 PM    BLOODU Negative 10/11/2024 09:04 PM    UROBILINOGEN 0.2 10/11/2024 09:04 PM    NITRU Negative 10/11/2024 09:04 PM    LEUKOCYTESUR Negative 10/11/2024 09:04 PM        Microbiology:  Results       ** No results found for the last 336 hours. **            All Labs from Last 24 Hrs:  Recent Results (from the past 24 hour(s))   Hemoglobin and Hematocrit    Collection Time: 10/12/24  1:51 PM   Result Value Ref Range    Hemoglobin 8.2 (L) 13.6 - 17.2 g/dL    Hematocrit 25.5 (L) 41.1 - 50.3 %   Hemoglobin and Hematocrit    Collection Time: 10/12/24  7:57 PM   Result Value Ref Range    Hemoglobin 8.3 (L) 13.6 - 17.2 g/dL    Hematocrit 25.4 (L) 41.1 - 50.3 %   CBC    Collection Time: 10/13/24  2:12 AM   Result Value Ref Range    WBC 6.4 4.3 - 11.1 K/uL    RBC 2.88 (L) 4.23 - 5.6 M/uL    Hemoglobin 8.4 (L) 13.6 - 17.2 g/dL    Hematocrit 25.6 (L) 41.1 - 50.3 %    MCV 88.9 82.0 - 102.0 FL    MCH 29.2 26.1 - 32.9 PG    MCHC 32.8 31.4 - 35.0 g/dL    RDW 15.0 (H) 11.9 - 14.6 %    Platelets 236 150 - 450 K/uL    MPV 9.7 9.4 - 12.3 FL    nRBC 0.00 0.0 - 0.2 K/uL   Basic Metabolic Panel w/ Reflex to MG    Collection Time: 10/13/24  2:12 AM   Result Value Ref Range    Sodium 141 136 - 145 mmol/L    Potassium 3.9 3.5 - 5.1 mmol/L    Chloride 106 98 - 107 mmol/L    CO2 25 20 - 28 mmol/L    Anion Gap 10 9 - 18 mmol/L    Glucose 90 70 - 99 mg/dL    BUN 8 6 - 23 MG/DL    Creatinine 0.80 0.80 - 1.30 MG/DL    Est, Glom Filt Rate >90 >60 ml/min/1.73m2    Calcium 8.2 (L) 8.8 - 10.2 MG/DL       No results for input(s): \"COVID19\" in the last 72 hours.    Recent Vital Data:  Patient Vitals for the past 24 hrs:   Temp Pulse Resp BP SpO2   10/13/24 0840 -- 79 15 (!) 105/52 100 %   10/13/24 0835 -- 82 15 (!) 107/50 100 %   10/13/24 0830 -- 86 -- (!) 100/48 100 %   10/13/24 0824 -- 65 -- (!) 107/53 100 %   10/13/24 0715 97.5 °F (36.4 °C) 63 14 126/63 97 %   10/12/24 1937 98.2

## 2024-10-13 NOTE — PROGRESS NOTES
TRANSFER - IN REPORT:    Verbal report received from 332 nurse on Edwin Reilly  being received from 332 for ordered procedure      Report consisted of patient's Situation, Background, Assessment and   Recommendations(SBAR).     Information from the following report(s) Nurse Handoff Report was reviewed with the receiving nurse.    Opportunity for questions and clarification was provided.      Assessment completed upon patient's arrival to unit and care assumed.

## 2024-10-13 NOTE — PROGRESS NOTES
TRANSFER - IN REPORT:    Verbal report received from ENDO on Edwin Reilly  being received from ENDO for routine progression of patient care      Report consisted of patient's Situation, Background, Assessment and   Recommendations(SBAR).     Information from the following report(s) Nurse Handoff Report was reviewed with the receiving nurse.    Opportunity for questions and clarification was provided.      Assessment completed upon patient's arrival to unit and care assumed.

## 2024-10-13 NOTE — PLAN OF CARE
EGD complete. See procedure note for full report. Significant for gastric and duodenal ulcers, as likely source of GI bleeding. No active bleeding seen.     - PPI BID for 8 weeks   - Avoid all NSAIDs   - Regular diet   - No barriers for discharge from GI standpoint     GI team will sign off at this time. Please call with any questions.

## 2024-10-14 ENCOUNTER — TELEPHONE (OUTPATIENT)
Dept: INTERNAL MEDICINE CLINIC | Facility: CLINIC | Age: 39
End: 2024-10-14

## 2024-10-14 NOTE — TELEPHONE ENCOUNTER
Made the first attempt to contact the patient using the phone number listed in chart to schedule appointment with our office today. Called through Language Services, Session Code [id number] ,  [place] [phone] Appt scheduled

## 2024-10-16 ENCOUNTER — TELEPHONE (OUTPATIENT)
Age: 39
End: 2024-10-16

## 2024-10-16 DIAGNOSIS — A04.8 H. PYLORI INFECTION: Primary | ICD-10-CM

## 2024-10-16 RX ORDER — METRONIDAZOLE 250 MG/1
250 TABLET ORAL 4 TIMES DAILY
Qty: 56 TABLET | Refills: 0 | Status: SHIPPED | OUTPATIENT
Start: 2024-10-16 | End: 2024-10-30

## 2024-10-16 RX ORDER — TETRACYCLINE HYDROCHLORIDE 500 MG/1
500 CAPSULE ORAL 4 TIMES DAILY
Qty: 56 CAPSULE | Refills: 0 | Status: SHIPPED | OUTPATIENT
Start: 2024-10-16 | End: 2024-10-30

## 2024-10-16 NOTE — TELEPHONE ENCOUNTER
Session Code 91598     Pt returned call to clinic, requested call back again with . Returned call to pt as requested via language line with  Jason Humphrey.     Reviewed results of EGD/biopsies with pt per Dr. Kirby.     Reviewed instructions with pt for completing H. Pylori Quadruple Therapy Treatment as ordered by Dr. Kirby. Gave instructions for breath retest 3 months after completion of treatment, holding PPI 2 weeks prior to retest. Emphasized importance taking all 4 medications exactly as prescribed for full 14 day course in order to eradicate H. Pylori. Told pt that pharmacy often does not fill the Pepto Bismol, so he should make sure to pick it up OTC if they do not. Pt verbalized understanding of all instructions, agreeable to  medications and complete treatment exactly as instructed. Pt denied any further questions at this time. Told pt to reach back out at any point with questions or if he has difficulty tolerating the medications, pt agreeable.

## 2024-10-16 NOTE — TELEPHONE ENCOUNTER
Session code 21993     Called pt to review results of EGD/biopsies per Dr. Kirby. Call completed via language line with  Avelina #[id number].    No answer, LVM instructing pt to call back direct RN line [phone] to review results and instructions for taking new medications sent in today by provider.     [employer] message sent to pt with instructions for completing H. Pylori treatment and retest in 3 months.       ---------------      Per Dr. Kirby: \"F/u results with patient. Treat for H pylori. Order breath test for 3 months after he has completed treatment.\"    \"Date Obtained:   10/13/2024   DIAGNOSIS       \"RANDOM GASTRIC BIOPSIES\":  CHRONIC ACTIVE GASTRITIS; ORGANISMS   CONSISTENT WITH HELICOBACTER PYLORI PRESENT.\"       \"Impression:         -  Normal esophagus.         - Non-bleeding gastric ulcer with a clean ulcer base (Kushal Class III).         -  Non-bleeding duodenal ulcer with a clean ulcer base (Kushal Class III).         - Gastric biopsies were taken with a cold forceps for Helicobacter pylori            testing.         - GI bleeding secondary to peptic ulcer disease.  Recommendation:         - Await pathology results. Treat for H pylori if positive.         - PPI BID for 8 weeks.         - Avoid all NSAIDs.\"

## 2024-10-18 NOTE — ED NOTES
Case management contact made with patient using  #[phone] to inquire about follow up care post discharge. Pt has GI FU appt scheduled.      Jailene Alves RN  10/18/24 [phone]

## 2024-10-23 NOTE — PROGRESS NOTES
Physician Progress Note      PATIENT:               TALIB HASKINS  CSN #:                  [id number]  :                       1985  ADMIT DATE:       10/11/2024 8:55 PM  DISCH DATE:        10/13/2024 1:00 PM  RESPONDING  PROVIDER #:        Jarred Bruno MD          QUERY TEXT:    Pt admitted with chronic GI bleeding and found to have duodenal and gastric   ulcer with no evidence of recent bleeding.  ED note states, \"acute blood loss   anemia.\"  10/12 IM note states, \"Upper GI bleed - unknown etiology. No any   acute symptoms of blood such as SOB, weakness, fatigue, or bright red stool   per rectum. Likely chronic anemia from physical findings such as pale   conjunctiva and oral mucosa. Unable to conclude due no past medical labs or   CBC.\"  Hgb (10/11) 7.1...(10/13) 8.4.  If possible, please clarify the acuity   of the anemia in the discharge summary:    The medical record reflects the following:  Risk Factors: chronic GIB, duodenal and gastric ulcer  Clinical Indicators: ED note states, \"acute blood loss anemia.\"  10/12 IM note   states, \"Upper GI bleed - unknown etiology. No any acute symptoms of blood   such as SOB, weakness, fatigue, or bright red stool per rectum. Likely chronic   anemia from physical findings such as pale conjunctiva and oral mucosa.   Unable to conclude due no past medical labs or CBC.\"  Hgb (10/11)   7.1...(10/13) 8.4.  Treatment: labs, Protonix, IVF, GI consult, EGD, PRBC  Options provided:  -- Anemia due to acute blood loss  -- Anemia due to chronic blood loss  -- Anemia due to acute on chronic blood loss  -- Other - I will add my own diagnosis  -- Disagree - Not applicable / Not valid  -- Disagree - Clinically unable to determine / Unknown  -- Refer to Clinical Documentation Reviewer    PROVIDER RESPONSE TEXT:    This patient has acute blood loss anemia.    Query created by: Belinda Thrasher on 10/18/2024 2:18 PM      Electronically signed by:  Jarred Bruno MD 10/23/2024 7:01

## 2024-11-15 ENCOUNTER — OFFICE VISIT (OUTPATIENT)
Dept: PRIMARY CARE CLINIC | Facility: CLINIC | Age: 39
End: 2024-11-15

## 2024-11-15 VITALS
DIASTOLIC BLOOD PRESSURE: 71 MMHG | HEIGHT: 68 IN | SYSTOLIC BLOOD PRESSURE: 134 MMHG | BODY MASS INDEX: 27.43 KG/M2 | TEMPERATURE: 98.3 F | HEART RATE: 59 BPM | WEIGHT: 181 LBS | OXYGEN SATURATION: 97 %

## 2024-11-15 DIAGNOSIS — K27.9 PEPTIC ULCER DISEASE: ICD-10-CM

## 2024-11-15 DIAGNOSIS — Z09 HOSPITAL DISCHARGE FOLLOW-UP: ICD-10-CM

## 2024-11-15 DIAGNOSIS — D64.9 ANEMIA, UNSPECIFIED TYPE: Primary | ICD-10-CM

## 2024-11-15 DIAGNOSIS — Z00.00 PHYSICAL EXAM, ANNUAL: ICD-10-CM

## 2024-11-15 DIAGNOSIS — Z86.19 HISTORY OF HELICOBACTER PYLORI INFECTION: ICD-10-CM

## 2024-11-15 RX ORDER — METRONIDAZOLE 250 MG/1
250 TABLET ORAL 3 TIMES DAILY
COMMUNITY

## 2024-11-15 RX ORDER — TETRACYCLINE HYDROCHLORIDE 500 MG/1
500 CAPSULE ORAL 4 TIMES DAILY
COMMUNITY

## 2024-11-15 SDOH — ECONOMIC STABILITY: INCOME INSECURITY: HOW HARD IS IT FOR YOU TO PAY FOR THE VERY BASICS LIKE FOOD, HOUSING, MEDICAL CARE, AND HEATING?: NOT HARD AT ALL

## 2024-11-15 SDOH — ECONOMIC STABILITY: FOOD INSECURITY: WITHIN THE PAST 12 MONTHS, THE FOOD YOU BOUGHT JUST DIDN'T LAST AND YOU DIDN'T HAVE MONEY TO GET MORE.: NEVER TRUE

## 2024-11-15 SDOH — ECONOMIC STABILITY: FOOD INSECURITY: WITHIN THE PAST 12 MONTHS, YOU WORRIED THAT YOUR FOOD WOULD RUN OUT BEFORE YOU GOT MONEY TO BUY MORE.: NEVER TRUE

## 2024-11-15 ASSESSMENT — PATIENT HEALTH QUESTIONNAIRE - PHQ9
SUM OF ALL RESPONSES TO PHQ QUESTIONS 1-9: 0
1. LITTLE INTEREST OR PLEASURE IN DOING THINGS: NOT AT ALL
SUM OF ALL RESPONSES TO PHQ QUESTIONS 1-9: 0
2. FEELING DOWN, DEPRESSED OR HOPELESS: NOT AT ALL
SUM OF ALL RESPONSES TO PHQ9 QUESTIONS 1 & 2: 0

## 2024-11-15 NOTE — PROGRESS NOTES
Comprehensive Metabolic Panel; Future  -     Lipid Panel; Future  -     TSH with Reflex; Future  4. Physical exam, annual  -     CBC with Auto Differential; Future  -     Comprehensive Metabolic Panel; Future  -     Lipid Panel; Future  -     TSH with Reflex; Future  5. Hospital discharge follow-up  -     [place] DISCHARGE MEDS RECONCILED W/ CURRENT OUTPATIENT MED LIST       No results found for any visits on 11/15/24.     Follow-up and Dispositions    Return in about 6 months (around 5/15/2025) for follow up as schedule or before if needed.             Signed By: Rayma Y Reyes, APRN - CNP     November 15, 2024

## 2024-11-26 DIAGNOSIS — D64.9 ANEMIA, UNSPECIFIED TYPE: ICD-10-CM

## 2024-11-26 DIAGNOSIS — K27.9 PEPTIC ULCER DISEASE: ICD-10-CM

## 2024-11-26 DIAGNOSIS — Z00.00 PHYSICAL EXAM, ANNUAL: ICD-10-CM

## 2024-11-26 LAB
ALBUMIN SERPL-MCNC: 4.2 G/DL (ref 3.5–5)
ALBUMIN/GLOB SERPL: 1.6 (ref 1–1.9)
ALP SERPL-CCNC: 86 U/L (ref 40–129)
ALT SERPL-CCNC: 16 U/L (ref 8–55)
ANION GAP SERPL CALC-SCNC: 9 MMOL/L (ref 7–16)
AST SERPL-CCNC: 22 U/L (ref 15–37)
BASOPHILS # BLD: 0 K/UL (ref 0–0.2)
BASOPHILS NFR BLD: 0 % (ref 0–2)
BILIRUB SERPL-MCNC: <0.2 MG/DL (ref 0–1.2)
BUN SERPL-MCNC: 17 MG/DL (ref 6–23)
CALCIUM SERPL-MCNC: 9.6 MG/DL (ref 8.8–10.2)
CHLORIDE SERPL-SCNC: 104 MMOL/L (ref 98–107)
CHOLEST SERPL-MCNC: 197 MG/DL (ref 0–200)
CO2 SERPL-SCNC: 28 MMOL/L (ref 20–29)
CREAT SERPL-MCNC: 0.95 MG/DL (ref 0.8–1.3)
DIFFERENTIAL METHOD BLD: ABNORMAL
EOSINOPHIL # BLD: 0.3 K/UL (ref 0–0.8)
EOSINOPHIL NFR BLD: 4 % (ref 0.5–7.8)
ERYTHROCYTE [DISTWIDTH] IN BLOOD BY AUTOMATED COUNT: 13 % (ref 11.9–14.6)
GLOBULIN SER CALC-MCNC: 2.6 G/DL (ref 2.3–3.5)
GLUCOSE SERPL-MCNC: 98 MG/DL (ref 70–99)
HCT VFR BLD AUTO: 44 % (ref 41.1–50.3)
HDLC SERPL-MCNC: 47 MG/DL (ref 40–60)
HDLC SERPL: 4.2 (ref 0–5)
HGB BLD-MCNC: 13.6 G/DL (ref 13.6–17.2)
IMM GRANULOCYTES # BLD AUTO: 0 K/UL (ref 0–0.5)
IMM GRANULOCYTES NFR BLD AUTO: 0 % (ref 0–5)
IRON SATN MFR SERPL: 8 % (ref 20–50)
IRON SERPL-MCNC: 26 UG/DL (ref 35–100)
LDLC SERPL CALC-MCNC: 127 MG/DL (ref 0–100)
LYMPHOCYTES # BLD: 1.4 K/UL (ref 0.5–4.6)
LYMPHOCYTES NFR BLD: 19 % (ref 13–44)
MCH RBC QN AUTO: 27 PG (ref 26.1–32.9)
MCHC RBC AUTO-ENTMCNC: 30.9 G/DL (ref 31.4–35)
MCV RBC AUTO: 87.5 FL (ref 82–102)
MONOCYTES # BLD: 0.9 K/UL (ref 0.1–1.3)
MONOCYTES NFR BLD: 11 % (ref 4–12)
NEUTS SEG # BLD: 5 K/UL (ref 1.7–8.2)
NEUTS SEG NFR BLD: 65 % (ref 43–78)
NRBC # BLD: 0 K/UL (ref 0–0.2)
PLATELET # BLD AUTO: 259 K/UL (ref 150–450)
PMV BLD AUTO: 10.9 FL (ref 9.4–12.3)
POTASSIUM SERPL-SCNC: 5 MMOL/L (ref 3.5–5.1)
PROT SERPL-MCNC: 6.8 G/DL (ref 6.3–8.2)
RBC # BLD AUTO: 5.03 M/UL (ref 4.23–5.6)
SODIUM SERPL-SCNC: 141 MMOL/L (ref 136–145)
TIBC SERPL-MCNC: 323 UG/DL (ref 240–450)
TRIGL SERPL-MCNC: 116 MG/DL (ref 0–150)
TSH W FREE THYROID IF ABNORMAL: 3.3 UIU/ML (ref 0.27–4.2)
UIBC SERPL-MCNC: 297 UG/DL (ref 112–347)
VLDLC SERPL CALC-MCNC: 23 MG/DL (ref 6–23)
WBC # BLD AUTO: 7.6 K/UL (ref 4.3–11.1)

## 2024-12-02 ENCOUNTER — TELEPHONE (OUTPATIENT)
Dept: PRIMARY CARE CLINIC | Facility: CLINIC | Age: 39
End: 2024-12-02

## 2024-12-02 NOTE — TELEPHONE ENCOUNTER
hemoglobin better than 1 month ago now 13.6 before 8.4   Start eating foods rich in iron . Liver green leafy vegetables , multivitamin with Iron.   LDL :127  low fat diet and avoid fried foods   Rest of labs normal     Spoke to patient regarding results.

## 2025-05-16 ENCOUNTER — TELEPHONE (OUTPATIENT)
Dept: PRIMARY CARE CLINIC | Facility: CLINIC | Age: 40
End: 2025-05-16

## 2025-05-16 NOTE — TELEPHONE ENCOUNTER
Change apt for physical will order labs in physical     Spoke to patient, appt changed to physical

## 2025-05-28 ENCOUNTER — OFFICE VISIT (OUTPATIENT)
Dept: PRIMARY CARE CLINIC | Facility: CLINIC | Age: 40
End: 2025-05-28
Payer: COMMERCIAL

## 2025-05-28 VITALS
BODY MASS INDEX: 27.43 KG/M2 | TEMPERATURE: 98.5 F | HEART RATE: 62 BPM | HEIGHT: 68 IN | DIASTOLIC BLOOD PRESSURE: 74 MMHG | WEIGHT: 181 LBS | OXYGEN SATURATION: 97 % | SYSTOLIC BLOOD PRESSURE: 124 MMHG

## 2025-05-28 DIAGNOSIS — Z86.19 HISTORY OF HELICOBACTER PYLORI INFECTION: ICD-10-CM

## 2025-05-28 DIAGNOSIS — Z13.220 SCREENING FOR LIPID DISORDERS: ICD-10-CM

## 2025-05-28 DIAGNOSIS — Z00.00 ENCOUNTER FOR WELL ADULT EXAM WITHOUT ABNORMAL FINDINGS: Primary | ICD-10-CM

## 2025-05-28 DIAGNOSIS — Z11.59 NEED FOR HEPATITIS C SCREENING TEST: ICD-10-CM

## 2025-05-28 DIAGNOSIS — Z11.4 SCREENING FOR HIV WITHOUT PRESENCE OF RISK FACTORS: ICD-10-CM

## 2025-05-28 PROCEDURE — 99395 PREV VISIT EST AGE 18-39: CPT

## 2025-05-28 SDOH — ECONOMIC STABILITY: FOOD INSECURITY: WITHIN THE PAST 12 MONTHS, THE FOOD YOU BOUGHT JUST DIDN'T LAST AND YOU DIDN'T HAVE MONEY TO GET MORE.: NEVER TRUE

## 2025-05-28 SDOH — ECONOMIC STABILITY: FOOD INSECURITY: WITHIN THE PAST 12 MONTHS, YOU WORRIED THAT YOUR FOOD WOULD RUN OUT BEFORE YOU GOT MONEY TO BUY MORE.: NEVER TRUE

## 2025-05-28 ASSESSMENT — PATIENT HEALTH QUESTIONNAIRE - PHQ9
2. FEELING DOWN, DEPRESSED OR HOPELESS: NOT AT ALL
SUM OF ALL RESPONSES TO PHQ QUESTIONS 1-9: 0
SUM OF ALL RESPONSES TO PHQ QUESTIONS 1-9: 0
1. LITTLE INTEREST OR PLEASURE IN DOING THINGS: NOT AT ALL
SUM OF ALL RESPONSES TO PHQ QUESTIONS 1-9: 0
SUM OF ALL RESPONSES TO PHQ QUESTIONS 1-9: 0

## 2025-05-28 NOTE — PROGRESS NOTES
Well Adult Note  Name: Edwin Reilly Today’s Date: 2025   MRN: [id number] Sex: Male   Age: 39 y.o. Ethnicity:  /    : 1985 Race:  /       Edwin Reilly is here for a well adult exam.       Assessment & Plan   Encounter for well adult exam without abnormal findings  -     CBC with Auto Differential; Future  Screening for lipid disorders  -     Lipid Panel; Future  Screening for HIV without presence of risk factors  -     HIV 1/2 Ag/Ab, 4TH Generation,W Rflx Confirm; Future  Need for hepatitis C screening test  -     Hepatitis C Antibody; Future  History of Helicobacter pylori infection      Return in 1 year (on 2026) for CPE (Physical Exam).       Subjective   History:  Pmhx   H.Pylori : has not completed the retest .   Denies any bleeding , acid reflux , diarrhea , constipation vomiting blood .   Address given today he can go and get tested done .     Review of Systems    No Known Allergies  Prior to Visit Medications    Medication Sig Taking? Authorizing Provider   metroNIDAZOLE (FLAGYL) 250 MG tablet Take 1 tablet by mouth 3 times daily  Patient not taking: Reported on 2025  Hattie Hennessy MD   tetracycline (ACHROMYCIN;SUMYCIN) 500 MG capsule Take 1 capsule by mouth 4 times daily  Patient not taking: Reported on 2025  Hattie Hennessy MD   pantoprazole (PROTONIX) 40 MG tablet Take 1 tablet by mouth 2 times daily (before meals)  Jarred Bruon MD     History reviewed. No pertinent past medical history.  Past Surgical History:   Procedure Laterality Date    UPPER GASTROINTESTINAL ENDOSCOPY N/A 10/13/2024    ESOPHAGOGASTRODUODENOSCOPY BIOPSY performed by Enedina Kirby MD at [facility] END[facility]     Family History   Problem Relation Age of Onset    No Known Problems Mother     No Known Problems Father     No Known Problems Brother      Social History     Tobacco Use    Smoking status: Never    Smokeless tobacco: Never   Vaping Use    Vaping

## 2025-08-05 DIAGNOSIS — Z13.220 SCREENING FOR LIPID DISORDERS: ICD-10-CM

## 2025-08-05 DIAGNOSIS — Z11.4 SCREENING FOR HIV WITHOUT PRESENCE OF RISK FACTORS: ICD-10-CM

## 2025-08-05 DIAGNOSIS — Z00.00 ENCOUNTER FOR WELL ADULT EXAM WITHOUT ABNORMAL FINDINGS: ICD-10-CM

## 2025-08-05 DIAGNOSIS — Z11.59 NEED FOR HEPATITIS C SCREENING TEST: ICD-10-CM

## 2025-08-05 LAB
BASOPHILS # BLD: 0.02 K/UL (ref 0–0.2)
BASOPHILS NFR BLD: 0.3 % (ref 0–2)
CHOLEST SERPL-MCNC: 220 MG/DL (ref 0–200)
DIFFERENTIAL METHOD BLD: NORMAL
EOSINOPHIL # BLD: 0.23 K/UL (ref 0–0.8)
EOSINOPHIL NFR BLD: 3.1 % (ref 0.5–7.8)
ERYTHROCYTE [DISTWIDTH] IN BLOOD BY AUTOMATED COUNT: 13.8 % (ref 11.9–14.6)
HCT VFR BLD AUTO: 47.5 % (ref 41.1–50.3)
HCV AB SER QL: NONREACTIVE
HDLC SERPL-MCNC: 45 MG/DL (ref 40–60)
HDLC SERPL: 4.8 (ref 0–5)
HGB BLD-MCNC: 16.4 G/DL (ref 13.6–17.2)
HIV 1+2 AB+HIV1 P24 AG SERPL QL IA: NONREACTIVE
HIV 1/2 RESULT COMMENT: NORMAL
IMM GRANULOCYTES # BLD AUTO: 0.04 K/UL (ref 0–0.5)
IMM GRANULOCYTES NFR BLD AUTO: 0.5 % (ref 0–5)
LDLC SERPL CALC-MCNC: 150 MG/DL (ref 0–100)
LYMPHOCYTES # BLD: 1.51 K/UL (ref 0.5–4.6)
LYMPHOCYTES NFR BLD: 20.4 % (ref 13–44)
MCH RBC QN AUTO: 29.5 PG (ref 26.1–32.9)
MCHC RBC AUTO-ENTMCNC: 34.5 G/DL (ref 31.4–35)
MCV RBC AUTO: 85.4 FL (ref 82–102)
MONOCYTES # BLD: 0.6 K/UL (ref 0.1–1.3)
MONOCYTES NFR BLD: 8.1 % (ref 4–12)
NEUTS SEG # BLD: 5.01 K/UL (ref 1.7–8.2)
NEUTS SEG NFR BLD: 67.6 % (ref 43–78)
NRBC # BLD: 0 K/UL (ref 0–0.2)
PLATELET # BLD AUTO: 228 K/UL (ref 150–450)
PMV BLD AUTO: 10.8 FL (ref 9.4–12.3)
RBC # BLD AUTO: 5.56 M/UL (ref 4.23–5.6)
TRIGL SERPL-MCNC: 123 MG/DL (ref 0–150)
VLDLC SERPL CALC-MCNC: 25 MG/DL (ref 6–23)
WBC # BLD AUTO: 7.4 K/UL (ref 4.3–11.1)

## 2025-08-06 ENCOUNTER — RESULTS FOLLOW-UP (OUTPATIENT)
Dept: PRIMARY CARE CLINIC | Facility: CLINIC | Age: 40
End: 2025-08-06

## 2025-08-18 ENCOUNTER — APPOINTMENT (OUTPATIENT)
Dept: GENERAL RADIOLOGY | Age: 40
End: 2025-08-18
Payer: COMMERCIAL

## 2025-08-18 ENCOUNTER — HOSPITAL ENCOUNTER (EMERGENCY)
Age: 40
Discharge: HOME | End: 2025-08-18
Payer: COMMERCIAL

## 2025-08-18 VITALS
DIASTOLIC BLOOD PRESSURE: 82 MMHG | OXYGEN SATURATION: 97 % | HEART RATE: 55 BPM | BODY MASS INDEX: 27.28 KG/M2 | WEIGHT: 180 LBS | RESPIRATION RATE: 16 BRPM | SYSTOLIC BLOOD PRESSURE: 132 MMHG | HEIGHT: 68 IN | TEMPERATURE: 98 F

## 2025-08-18 DIAGNOSIS — S16.1XXA STRAIN OF NECK MUSCLE, INITIAL ENCOUNTER: ICD-10-CM
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
## 2025-08-18 PROCEDURE — 72040 X-RAY EXAM NECK SPINE 2-3 VW: CPT

## 2025-08-18 PROCEDURE — 99283 EMERGENCY DEPT VISIT LOW MDM: CPT

## 2025-08-18 PROCEDURE — 73030 X-RAY EXAM OF SHOULDER: CPT

## 2025-08-18 RX ORDER — MELOXICAM 15 MG/1
15 TABLET ORAL DAILY
Qty: 30 TABLET | Refills: 0 | Status: SHIPPED | OUTPATIENT
Start: 2025-08-18 | End: 2025-09-17

## 2025-08-18 RX ORDER — METHOCARBAMOL 750 MG/1
750 TABLET, FILM COATED ORAL 4 TIMES DAILY
Qty: 40 TABLET | Refills: 0 | Status: SHIPPED | OUTPATIENT
Start: 2025-08-18 | End: 2025-08-28

## 2025-08-18 ASSESSMENT — LIFESTYLE VARIABLES
HOW OFTEN DO YOU HAVE A DRINK CONTAINING ALCOHOL: 2-4 TIMES A MONTH
HOW MANY STANDARD DRINKS CONTAINING ALCOHOL DO YOU HAVE ON A TYPICAL DAY: 1 OR 2

## 2025-08-18 ASSESSMENT — PAIN SCALES - GENERAL: PAINLEVEL_OUTOF10: 0

## 2025-08-18 ASSESSMENT — PAIN - FUNCTIONAL ASSESSMENT: PAIN_FUNCTIONAL_ASSESSMENT: 0-10

## (undated) DEVICE — CONNECTOR TBNG OD5-7MM O2 END DISP

## (undated) DEVICE — SINGLE PORT MANIFOLD: Brand: NEPTUNE 2

## (undated) DEVICE — MOUTHPIECE ENDOSCP L CTRL OPN AND SIDE PORTS DISP

## (undated) DEVICE — AIRLIFE™ OXYGEN TUBING 7 FEET (2.1 M) CRUSH RESISTANT OXYGEN TUBING, VINYL TIPPED: Brand: AIRLIFE™

## (undated) DEVICE — CONTAINER FORMALIN PREFILLED 10% NBF 60ML

## (undated) DEVICE — CANNULA NSL ORAL AD FOR CAPNOFLEX CO2 O2 AIRLFE

## (undated) DEVICE — KENDALL RADIOLUCENT FOAM MONITORING ELECTRODE RECTANGULAR SHAPE: Brand: KENDALL

## (undated) DEVICE — BLOCK BITE AD 60FR W/ VELC STRP ADDRESSES MOST PT AND

## (undated) DEVICE — ENDOSCOPIC KIT 1.1+ OP4 CA DE 2 GWN AAMI LEVEL 3

## (undated) DEVICE — GAUZE,SPONGE,4"X4",12PLY,WOVEN,NS,LF: Brand: MEDLINE

## (undated) DEVICE — FORCEPS BX L240CM JAW DIA2.8MM L CAP W/ NDL MIC MESH TOOTH